# Patient Record
Sex: MALE | Race: WHITE | NOT HISPANIC OR LATINO | Employment: OTHER | URBAN - METROPOLITAN AREA
[De-identification: names, ages, dates, MRNs, and addresses within clinical notes are randomized per-mention and may not be internally consistent; named-entity substitution may affect disease eponyms.]

---

## 2018-07-27 ENCOUNTER — HOSPITAL ENCOUNTER (EMERGENCY)
Facility: HOSPITAL | Age: 38
Discharge: HOME | End: 2018-07-27
Attending: EMERGENCY MEDICINE
Payer: MEDICAID

## 2018-07-27 VITALS
OXYGEN SATURATION: 99 % | TEMPERATURE: 98 F | RESPIRATION RATE: 18 BRPM | SYSTOLIC BLOOD PRESSURE: 116 MMHG | HEIGHT: 69 IN | WEIGHT: 200 LBS | DIASTOLIC BLOOD PRESSURE: 65 MMHG | BODY MASS INDEX: 29.62 KG/M2 | HEART RATE: 64 BPM

## 2018-07-27 DIAGNOSIS — R05.9 COUGH: ICD-10-CM

## 2018-07-27 DIAGNOSIS — J06.9 UPPER RESPIRATORY TRACT INFECTION, UNSPECIFIED TYPE: Primary | ICD-10-CM

## 2018-07-27 LAB — DEPRECATED S PYO AG THROAT QL EIA: NEGATIVE

## 2018-07-27 PROCEDURE — 25000003 PHARM REV CODE 250: Performed by: EMERGENCY MEDICINE

## 2018-07-27 PROCEDURE — 87880 STREP A ASSAY W/OPTIC: CPT

## 2018-07-27 PROCEDURE — 99284 EMERGENCY DEPT VISIT MOD MDM: CPT

## 2018-07-27 PROCEDURE — 87081 CULTURE SCREEN ONLY: CPT

## 2018-07-27 PROCEDURE — 63600175 PHARM REV CODE 636 W HCPCS: Performed by: EMERGENCY MEDICINE

## 2018-07-27 RX ORDER — NAPROXEN 500 MG/1
500 TABLET ORAL
Status: COMPLETED | OUTPATIENT
Start: 2018-07-27 | End: 2018-07-27

## 2018-07-27 RX ORDER — ACETAMINOPHEN 500 MG
1000 TABLET ORAL
Status: COMPLETED | OUTPATIENT
Start: 2018-07-27 | End: 2018-07-27

## 2018-07-27 RX ORDER — NAPROXEN 500 MG/1
500 TABLET ORAL 2 TIMES DAILY PRN
Qty: 20 TABLET | Refills: 0 | Status: SHIPPED | OUTPATIENT
Start: 2018-07-27

## 2018-07-27 RX ORDER — ACETAMINOPHEN 500 MG
1000 TABLET ORAL EVERY 6 HOURS PRN
Qty: 20 TABLET | Refills: 0 | Status: SHIPPED | OUTPATIENT
Start: 2018-07-27

## 2018-07-27 RX ORDER — DEXAMETHASONE 4 MG/1
4 TABLET ORAL
Status: COMPLETED | OUTPATIENT
Start: 2018-07-27 | End: 2018-07-27

## 2018-07-27 RX ADMIN — NAPROXEN 500 MG: 500 TABLET ORAL at 12:07

## 2018-07-27 RX ADMIN — DEXAMETHASONE 4 MG: 4 TABLET ORAL at 12:07

## 2018-07-27 RX ADMIN — ACETAMINOPHEN 1000 MG: 500 TABLET, FILM COATED ORAL at 12:07

## 2018-07-27 NOTE — ED PROVIDER NOTES
"Encounter Date: 7/27/2018  SORT: This is a 38 y.o. male who presents for emergent consideration of nasal congestion. Patient reports nasal congestion, body aches, productive cough x 2-3 days. Patient reports theraflu and nyquil with mild relief.  Initial exam: no acute respiratory distress, +sinus congestion, maxillary sinus TTP. Patient will be moved to a room when one is available. SHANIKA Lagos, PA-C        History     Chief Complaint   Patient presents with    Nasal Congestion     " I have a bad cold, my body is aching and Im having cold sweats. Reports fever yesterday"     HPI  Review of patient's allergies indicates:  Allergies not on file  Past Medical History:   Diagnosis Date    Known health problems: none      No past surgical history on file.  No family history on file.  Social History   Substance Use Topics    Smoking status: Current Every Day Smoker     Types: Cigarettes    Smokeless tobacco: Not on file    Alcohol use Not on file     Review of Systems    Physical Exam     Initial Vitals [07/27/18 1208]   BP Pulse Resp Temp SpO2   (!) 142/79 74 18 97.7 °F (36.5 °C) 100 %      MAP       --         Physical Exam    ED Course   Procedures  Labs Reviewed   THROAT SCREEN, RAPID          Imaging Results          X-Ray Chest PA And Lateral (In process)                                    ED Course as of Jul 27 1306   Fri Jul 27, 2018   1255 nad X-Ray Chest PA And Lateral [NO]   1303 Unremarkable Rapid Strep A Screen: Negative [NO]      ED Course User Index  [NO] Claudia Cassidy MD     Clinical Impression:   The primary encounter diagnosis was Upper respiratory tract infection, unspecified type. A diagnosis of Cough was also pertinent to this visit.          _____________________________________________________________________    ED Provider Note    Chief complaint: body aches, sore throat    HPI:  38-year-old gentleman who is a daily smoker of tobacco presents with mild, scratching sore throat that " has been occurring for the last 2 days.  It is associated with generalized body aches.  He has noted nasal congestion and a runny nose. He intermittently cause nonproductive.    Of note the patient thinks it is unrelated he accidentally hit the left side of his head on a fan while he was at home when somebody inadvertently scared him.    Positive subjective fevers.  Past Medical History:   Diagnosis Date    Known health problems: none      No past surgical history on file.  Social History     Social History    Marital status: Single     Spouse name: N/A    Number of children: N/A    Years of education: N/A     Occupational History    commercial [employer]      Social History Main Topics    Smoking status: Current Every Day Smoker     Types: Cigarettes    Smokeless tobacco: Not on file    Alcohol use Not on file    Drug use: Unknown    Sexual activity: Yes     Other Topics Concern    Not on file     Social History Narrative    No narrative on file     Review of patient's allergies indicates:  No Known Allergies    ROS  ROS per history of present illness  Constitutional: Negative for activity change, appetite change, diaphoresis and unexpected weight change.   HENT: Negative for dental problem, drooling, ear pain and hearing loss.    Eyes: Negative for pain, discharge, redness and itching.   Musculoskeletal: Negative for arthralgias, gait problem, joint swelling and neck stiffness.   Skin: Negative for color change, pallor, rash.  Positive wound.   Allergic/Immunologic: Negative for environmental allergies, food allergies and immunocompromised state.   Neurological: Negative for tremors, seizures, facial asymmetry and speech difficulty.   Hematological: Negative for adenopathy. Does not bruise/bleed easily.   Psychiatric/Behavioral: Negative for agitation and dysphoric mood.   All other systems reviewed and are negative.      PHYSICAL EXAM:  Vitals:    07/27/18 1208   BP: (!) 142/79   Pulse: 74   Resp: 18  "  Temp: 97.7 °F (36.5 °C)   TempSrc: Oral   SpO2: 100%   Weight: 90.7 kg (200 lb)   Height: 5' 9" (1.753 m)       Vital signs and nursing assessment noted:  Afebrile    GEN:   NAD, A & Ox3, well appearing, nontoxic appearing  HEENT:  Abrasion to his left forehead, positive rhinorrhea, PERRLA, EOMI, moist membranes, nl conjunctiva, no scleral icterus, no nystagmus, no nodes/nodules, soft, supple, FROM, no tracheal deviation  CV:   RRR no m/r/g, 2+ radial pulses, <2sec cap refill, no obvious JVD  RESP:  CTA B, no w/r/r, equal and bilateral chest rise, no respiratory distress  ABD:   soft, Nontender, Nondistended, +BS, no guarding/rebound  BACK:  FROM, no midline tenderness, no paraspinal tenderness  :   Deferred  EXT:   FROM, DELGADO x 4, no edema, no calf tenderness, no swelling  LYMPH:  no gross adenopathy  NEURO:  CN II-XII grossly intact, no obvious motor/sensory deficit, no tremor, nl coordination  PSYCH:   no SI/HI, no anxiety, nl mood/affect, no SI/HI, no AH/VH  SKIN:  Warm, dry, intact, no rashes/lesions or masses, nl color, no pallor    Labs Reviewed   THROAT SCREEN, RAPID     X-Ray Chest PA And Lateral    (Results Pending)       MEDICAL DECISION MAKIN-year-old gentleman who smokes cigarettes presents with sore throat, body aches and runny nose. Exam shows Abrasion to his left forehead, positive rhinorrhea.    Differential diagnosis includes but not exclusive to: Pharyngitis, URI, influenza, viral syndrome,  reactive airway disease and sinusitis. Unlikely PNA.    Treatment plan includes physical exam, cardiac monitoring, labs, imaging studies,  and supportive care.  Labs and imaging studies reviewed and independently interpreted:        ED Course as of    1255 nad X-Ray Chest PA And Lateral [NO]   1303 Unremarkable Rapid Strep A Screen: Negative [NO]      ED Course User Index  [NO] Claudia Cassidy MD       Patient improved after treatment   Medications   dexamethasone " tablet 4 mg (not administered)   naproxen tablet 500 mg (not administered)   acetaminophen tablet 1,000 mg (not administered)       Patient is tolerating PO and ambulating with steady gait.    Family and patient updated on care.  Pt agrees with assessment, disposition and treatment plan and has no further questions or complaints at this time. Given return precautions and demonstrates understanding.    Patient will  follow up with primary care physician as an outpatient.  Prescription given:      Medication List      START taking these medications    acetaminophen 500 MG tablet  Commonly known as:  TYLENOL  Take 2 tablets (1,000 mg total) by mouth every 6 (six) hours as needed for Pain.     naproxen 500 MG tablet  Commonly known as:  NAPROSYN  Take 1 tablet (500 mg total) by mouth 2 (two) times daily as needed (Pain).     sodium chloride 0.65 % nasal spray  Commonly known as:  SALINE NASAL  1 spray by Nasal route as needed for Congestion.           Where to Get Your Medications      You can get these medications from any pharmacy    Bring a paper prescription for each of these medications  · acetaminophen 500 MG tablet  · naproxen 500 MG tablet  · sodium chloride 0.65 % nasal spray           CLINICAL IMPRESSION:  1. Upper respiratory tract infection, unspecified type    2. Cough        DISPOSITION: Discharged to home under stable conditions                        Claudia Cassidy MD  07/27/18 1809

## 2018-07-31 LAB — BACTERIA THROAT CULT: NORMAL

## 2020-02-13 ENCOUNTER — HOSPITAL ENCOUNTER (EMERGENCY)
Facility: HOSPITAL | Age: 40
Discharge: HOME | End: 2020-02-13
Attending: EMERGENCY MEDICINE
Payer: MEDICAID

## 2020-02-13 VITALS
TEMPERATURE: 98 F | WEIGHT: 170 LBS | BODY MASS INDEX: 25.1 KG/M2 | SYSTOLIC BLOOD PRESSURE: 148 MMHG | OXYGEN SATURATION: 100 % | HEART RATE: 84 BPM | DIASTOLIC BLOOD PRESSURE: 88 MMHG | RESPIRATION RATE: 14 BRPM

## 2020-02-13 DIAGNOSIS — R09.81 NASAL CONGESTION: ICD-10-CM

## 2020-02-13 DIAGNOSIS — H65.91 OME (OTITIS MEDIA WITH EFFUSION), RIGHT: Primary | ICD-10-CM

## 2020-02-13 DIAGNOSIS — B34.9 VIRAL SYNDROME: ICD-10-CM

## 2020-02-13 PROCEDURE — 99282 EMERGENCY DEPT VISIT SF MDM: CPT

## 2020-02-13 RX ORDER — FLUTICASONE PROPIONATE 50 MCG
1 SPRAY, SUSPENSION (ML) NASAL 2 TIMES DAILY PRN
Qty: 15 G | Refills: 0 | OUTPATIENT
Start: 2020-02-13 | End: 2021-02-23

## 2020-02-13 RX ORDER — OXYMETAZOLINE HCL 0.05 %
1 SPRAY, NON-AEROSOL (ML) NASAL 2 TIMES DAILY
Qty: 15 ML | Refills: 0 | Status: SHIPPED | OUTPATIENT
Start: 2020-02-13 | End: 2020-02-16

## 2020-02-13 RX ORDER — LORATADINE 10 MG/1
10 TABLET ORAL DAILY
Qty: 7 TABLET | Refills: 0 | Status: SHIPPED | OUTPATIENT
Start: 2020-02-13 | End: 2020-02-20

## 2020-02-13 NOTE — ED NOTES
39 y.o. Male to ED with c.o. Cough, congestion, diarrhea, x 2 weeks. Patient denies fever/chills, denies vomiting. Patient denies blood in diarrhea.

## 2020-02-13 NOTE — ED PROVIDER NOTES
"Encounter Date: 2/13/2020    SCRIBE #1 NOTE: I, Radha Suggskayleywacharlie, am scribing for, and in the presence of,  Jose Tadeo PA-C. I have scribed the following portions of the note - Other sections scribed: HPI, ROS.       History     Chief Complaint   Patient presents with    URI     Pt states," Cough, congestion for over one week. My ears have been ringing for over one week also."     CC: Congestion    HPI: This 39 y.o male, with no medical history, presents to the ED c/o congestion, rhinorrhea, a productive cough and tinnitus (to the right ear) for the last 2 weeks. Pt reports that he has also been experiencing diarrhea (2x episodes today). The symptoms are constant and severe (9/10). Pt denies a history of sinus issues. He also denies fever or abdominal pain. No other associated symptoms. No alleviating factors.     The history is provided by the patient.     Review of patient's allergies indicates:  No Known Allergies  Past Medical History:   Diagnosis Date    Known health problems: none      No past surgical history on file.  No family history on file.  Social History     Tobacco Use    Smoking status: Current Every Day Smoker     Types: Cigarettes   Substance Use Topics    Alcohol use: Not on file    Drug use: Not on file     Review of Systems   Constitutional: Negative for fever.   HENT: Positive for congestion, rhinorrhea and tinnitus (right ear). Negative for sore throat.    Respiratory: Positive for cough. Negative for shortness of breath.    Cardiovascular: Negative for chest pain.   Gastrointestinal: Positive for diarrhea. Negative for abdominal pain and nausea.   Genitourinary: Negative for dysuria.   Musculoskeletal: Negative for back pain.   Skin: Negative for rash.   Neurological: Negative for weakness.       Physical Exam     Initial Vitals [02/13/20 0922]   BP Pulse Resp Temp SpO2   (!) 155/91 82 16 97.8 °F (36.6 °C) 99 %      MAP       --         Physical Exam    Nursing note and vitals " reviewed.  Constitutional: He appears well-developed and well-nourished. He is not diaphoretic. No distress.   HENT:   Head: Normocephalic and atraumatic.   Bilateral non purulent effusions that is more prominent on the right.  Bony landmarks are clearly discernible.  External ears and mastoids normal.  Nasal congestion present.  No sinus tenderness. No meningeal signs.  Oropharynx normal.   Eyes: Conjunctivae and EOM are normal. Pupils are equal, round, and reactive to light. Right eye exhibits no discharge. Left eye exhibits no discharge.   Neck: Normal range of motion. No tracheal deviation present. No JVD present.   Cardiovascular: Normal rate, regular rhythm and normal heart sounds. Exam reveals no friction rub.    No murmur heard.  Pulmonary/Chest: Breath sounds normal. No stridor. No respiratory distress. He has no wheezes. He has no rhonchi. He has no rales. He exhibits no tenderness.   Musculoskeletal: Normal range of motion.   Neurological: He is alert and oriented to person, place, and time.   Skin: Skin is warm and dry. No rash and no abscess noted. No erythema. No pallor.         ED Course   Procedures  Labs Reviewed - No data to display       Imaging Results    None          Medical Decision Making:   History:   Old Medical Records: I decided to obtain old medical records.      This is an emergent evaluation of a 39 y.o. male presenting to the ED for otalgia associated with nasal congestion. Denies trauma, fever, drainage from ear, and hearing loss. Afebrile. Patient is non-toxic.    Presentation consistent with OME. No TM perforation. No signs of acute bacterial etiology from the ear at this time, including for AOM, bullous myringitis, OE, and mastoiditis. I doubt referred pain from dental etiology, bacterial rhinosinusitis, and deep space head/neck infection. No evidence to suggest herpes zoster oticus or otomycosis. No signs of cholesteatoma. Less consistent with inner ear etiology, including for  labyrinthitis and meniere's.      Discharged home with supportive care. Instructed to follow up with PCP and ENT for reevaluation and management of symptoms.     I discussed with the patient the diagnosis, treatment plan, indications for return to the emergency department, and for expected follow-up. The patient verbalized an understanding. The patient is asked if there are any questions or concerns. We discuss the case, until all issues are addressed to the patients satisfaction. Patient understands and is agreeable to the plan.           Scribe Attestation:   Scribe #1: I performed the above scribed service and the documentation accurately describes the services I performed. I attest to the accuracy of the note.                          Clinical Impression:       ICD-10-CM ICD-9-CM   1. OME (otitis media with effusion), right H65.91 381.4   2. Nasal congestion R09.81 478.19   3. Viral syndrome B34.9 079.99             I, Jose Tadeo PA-C, personally performed the services described in this documentation. All medical record entries made by the scribe were at my direction and in my presence. I have reviewed the chart and agree that the record reflects my personal performance and is accurate and complete.                 Jose Tadeo PA-C  02/13/20 1044

## 2021-02-23 ENCOUNTER — HOSPITAL ENCOUNTER (EMERGENCY)
Facility: HOSPITAL | Age: 41
Discharge: HOME | End: 2021-02-23
Attending: EMERGENCY MEDICINE
Payer: MEDICAID

## 2021-02-23 VITALS
SYSTOLIC BLOOD PRESSURE: 131 MMHG | HEIGHT: 69 IN | TEMPERATURE: 98 F | WEIGHT: 170 LBS | BODY MASS INDEX: 25.18 KG/M2 | RESPIRATION RATE: 18 BRPM | OXYGEN SATURATION: 100 % | HEART RATE: 83 BPM | DIASTOLIC BLOOD PRESSURE: 89 MMHG

## 2021-02-23 DIAGNOSIS — J06.9 VIRAL URI WITH COUGH: ICD-10-CM

## 2021-02-23 DIAGNOSIS — R07.9 CHEST PAIN: Primary | ICD-10-CM

## 2021-02-23 DIAGNOSIS — Z82.41 FAMILY HISTORY OF SUDDEN CARDIAC DEATH IN FATHER: ICD-10-CM

## 2021-02-23 LAB
ALBUMIN SERPL BCP-MCNC: 4.1 G/DL (ref 3.5–5.2)
ALP SERPL-CCNC: 89 U/L (ref 55–135)
ALT SERPL W/O P-5'-P-CCNC: 17 U/L (ref 10–44)
ANION GAP SERPL CALC-SCNC: 7 MMOL/L (ref 8–16)
AST SERPL-CCNC: 18 U/L (ref 10–40)
BASOPHILS # BLD AUTO: 0.04 K/UL (ref 0–0.2)
BASOPHILS NFR BLD: 0.5 % (ref 0–1.9)
BILIRUB SERPL-MCNC: 0.3 MG/DL (ref 0.1–1)
BNP SERPL-MCNC: <10 PG/ML (ref 0–99)
BUN SERPL-MCNC: 17 MG/DL (ref 6–20)
CALCIUM SERPL-MCNC: 9.2 MG/DL (ref 8.7–10.5)
CHLORIDE SERPL-SCNC: 103 MMOL/L (ref 95–110)
CO2 SERPL-SCNC: 29 MMOL/L (ref 23–29)
CREAT SERPL-MCNC: 1.3 MG/DL (ref 0.5–1.4)
CTP QC/QA: YES
DIFFERENTIAL METHOD: NORMAL
EOSINOPHIL # BLD AUTO: 0.1 K/UL (ref 0–0.5)
EOSINOPHIL NFR BLD: 1.3 % (ref 0–8)
ERYTHROCYTE [DISTWIDTH] IN BLOOD BY AUTOMATED COUNT: 13.2 % (ref 11.5–14.5)
EST. GFR  (AFRICAN AMERICAN): >60 ML/MIN/1.73 M^2
EST. GFR  (NON AFRICAN AMERICAN): >60 ML/MIN/1.73 M^2
GLUCOSE SERPL-MCNC: 101 MG/DL (ref 70–110)
HCT VFR BLD AUTO: 43.7 % (ref 40–54)
HGB BLD-MCNC: 14.4 G/DL (ref 14–18)
IMM GRANULOCYTES # BLD AUTO: 0.04 K/UL (ref 0–0.04)
IMM GRANULOCYTES NFR BLD AUTO: 0.5 % (ref 0–0.5)
LYMPHOCYTES # BLD AUTO: 2.7 K/UL (ref 1–4.8)
LYMPHOCYTES NFR BLD: 32.7 % (ref 18–48)
MCH RBC QN AUTO: 28.9 PG (ref 27–31)
MCHC RBC AUTO-ENTMCNC: 33 G/DL (ref 32–36)
MCV RBC AUTO: 88 FL (ref 82–98)
MONOCYTES # BLD AUTO: 0.5 K/UL (ref 0.3–1)
MONOCYTES NFR BLD: 6.4 % (ref 4–15)
NEUTROPHILS # BLD AUTO: 4.9 K/UL (ref 1.8–7.7)
NEUTROPHILS NFR BLD: 58.6 % (ref 38–73)
NRBC BLD-RTO: 0 /100 WBC
PLATELET # BLD AUTO: 261 K/UL (ref 150–350)
PMV BLD AUTO: 10.3 FL (ref 9.2–12.9)
POTASSIUM SERPL-SCNC: 4.6 MMOL/L (ref 3.5–5.1)
PROT SERPL-MCNC: 7.3 G/DL (ref 6–8.4)
RBC # BLD AUTO: 4.98 M/UL (ref 4.6–6.2)
SARS-COV-2 RDRP RESP QL NAA+PROBE: NEGATIVE
SODIUM SERPL-SCNC: 139 MMOL/L (ref 136–145)
TROPONIN I SERPL DL<=0.01 NG/ML-MCNC: <0.006 NG/ML (ref 0–0.03)
WBC # BLD AUTO: 8.28 K/UL (ref 3.9–12.7)

## 2021-02-23 PROCEDURE — 85025 COMPLETE CBC W/AUTO DIFF WBC: CPT

## 2021-02-23 PROCEDURE — 93005 ELECTROCARDIOGRAM TRACING: CPT

## 2021-02-23 PROCEDURE — 25000003 PHARM REV CODE 250: Performed by: PHYSICIAN ASSISTANT

## 2021-02-23 PROCEDURE — 93010 ELECTROCARDIOGRAM REPORT: CPT | Mod: ,,, | Performed by: INTERNAL MEDICINE

## 2021-02-23 PROCEDURE — 80053 COMPREHEN METABOLIC PANEL: CPT

## 2021-02-23 PROCEDURE — 93010 EKG 12-LEAD: ICD-10-PCS | Mod: ,,, | Performed by: INTERNAL MEDICINE

## 2021-02-23 PROCEDURE — U0002 COVID-19 LAB TEST NON-CDC: HCPCS | Performed by: NURSE PRACTITIONER

## 2021-02-23 PROCEDURE — 84484 ASSAY OF TROPONIN QUANT: CPT

## 2021-02-23 PROCEDURE — 83880 ASSAY OF NATRIURETIC PEPTIDE: CPT

## 2021-02-23 PROCEDURE — U0005 INFEC AGEN DETEC AMPLI PROBE: HCPCS

## 2021-02-23 PROCEDURE — U0003 INFECTIOUS AGENT DETECTION BY NUCLEIC ACID (DNA OR RNA); SEVERE ACUTE RESPIRATORY SYNDROME CORONAVIRUS 2 (SARS-COV-2) (CORONAVIRUS DISEASE [COVID-19]), AMPLIFIED PROBE TECHNIQUE, MAKING USE OF HIGH THROUGHPUT TECHNOLOGIES AS DESCRIBED BY CMS-2020-01-R: HCPCS

## 2021-02-23 PROCEDURE — 99285 EMERGENCY DEPT VISIT HI MDM: CPT | Mod: 25

## 2021-02-23 RX ORDER — CETIRIZINE HYDROCHLORIDE 10 MG/1
10 TABLET ORAL DAILY
Qty: 7 TABLET | Refills: 0 | Status: SHIPPED | OUTPATIENT
Start: 2021-02-23 | End: 2021-03-02

## 2021-02-23 RX ORDER — GUAIFENESIN/DEXTROMETHORPHAN 100-10MG/5
10 SYRUP ORAL 4 TIMES DAILY PRN
Qty: 120 ML | Refills: 0 | Status: SHIPPED | OUTPATIENT
Start: 2021-02-23 | End: 2021-03-05

## 2021-02-23 RX ORDER — ASPIRIN 325 MG
325 TABLET ORAL
Status: COMPLETED | OUTPATIENT
Start: 2021-02-23 | End: 2021-02-23

## 2021-02-23 RX ORDER — FLUTICASONE PROPIONATE 50 MCG
2 SPRAY, SUSPENSION (ML) NASAL DAILY PRN
Qty: 15 G | Refills: 0 | Status: SHIPPED | OUTPATIENT
Start: 2021-02-23

## 2021-02-23 RX ADMIN — ASPIRIN 325 MG ORAL TABLET 325 MG: 325 PILL ORAL at 04:02

## 2021-02-24 ENCOUNTER — PATIENT MESSAGE (OUTPATIENT)
Dept: ADMINISTRATIVE | Facility: OTHER | Age: 41
End: 2021-02-24

## 2021-02-24 ENCOUNTER — NURSE TRIAGE (OUTPATIENT)
Dept: ADMINISTRATIVE | Facility: CLINIC | Age: 41
End: 2021-02-24

## 2021-02-24 LAB — SARS-COV-2 RNA RESP QL NAA+PROBE: NOT DETECTED

## 2021-02-25 ENCOUNTER — NURSE TRIAGE (OUTPATIENT)
Dept: ADMINISTRATIVE | Facility: CLINIC | Age: 41
End: 2021-02-25

## 2021-02-25 ENCOUNTER — PATIENT MESSAGE (OUTPATIENT)
Dept: ADMINISTRATIVE | Facility: OTHER | Age: 41
End: 2021-02-25

## 2021-02-26 ENCOUNTER — NURSE TRIAGE (OUTPATIENT)
Dept: ADMINISTRATIVE | Facility: CLINIC | Age: 41
End: 2021-02-26

## 2021-02-26 ENCOUNTER — PATIENT MESSAGE (OUTPATIENT)
Dept: ADMINISTRATIVE | Facility: OTHER | Age: 41
End: 2021-02-26